# Patient Record
Sex: FEMALE | Race: WHITE
[De-identification: names, ages, dates, MRNs, and addresses within clinical notes are randomized per-mention and may not be internally consistent; named-entity substitution may affect disease eponyms.]

---

## 2018-02-08 ENCOUNTER — HOSPITAL ENCOUNTER (EMERGENCY)
Dept: HOSPITAL 46 - ED | Age: 58
Discharge: HOME | End: 2018-02-08
Payer: MEDICARE

## 2018-02-08 VITALS — WEIGHT: 145.57 LBS | BODY MASS INDEX: 24.25 KG/M2 | HEIGHT: 65 IN
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
## 2018-02-08 DIAGNOSIS — Z86.73: ICD-10-CM

## 2018-02-08 DIAGNOSIS — M16.12: Primary | ICD-10-CM

## 2018-02-08 DIAGNOSIS — Z79.899: ICD-10-CM

## 2018-02-08 DIAGNOSIS — Z87.891: ICD-10-CM

## 2022-03-24 NOTE — OR
[facility]
                                    [address], [place]  [address]
_________________________________________________________________________________________
                                                                 Signed   
 
 
DATE OF OPERATION:
03/24/2022
 
SURGEON:
Jennifer Barfield MD
 
PREOPERATIVE DIAGNOSES:
1. Personal history of colonic polyps 2016.
2. Mother with hyperplastic polyps.
 
POSTOPERATIVE DIAGNOSES:
1. 3-4 mm polyps at 7 cm.
2. 4 mm polyp at 20 cm/sigmoid colon.
3. Tortuous sigmoid colon.
4. 3 mm polyps at 12 cm/rectum.
 
PROCEDURE:
Colonoscopy with hot biopsy.
 
ESTIMATED BLOOD LOSS:
None.
 
INDICATIONS:
Reza is a 61-year-old female, who returns for her followup colonoscopy.  Her initial
colonoscopy in 2016 came at the age of 56.  We removed one tubular adenomatous polyp in
the sigmoid colon.  We know her mother had hyperplastic polyps in the past.  Reza had
a brain aneurysm surgery in the 1990s.  She had a stroke during that surgery.  She now
uses a cane to ambulate.  She has a lot of trouble with her left arm and leg.  She gets
around town on the bus.  Her brother comes out from [place], [place] every 2 to 3 weeks
to check on her and the mother.  He will be taking her home afterwards.  She has no
lower GI complaints.  In the office, I gave her a pamphlet on colonoscopy.  We reviewed
the nature of the test.  There is risk including, but not limited to gas bloating,
crampy abdominal pain, bleeding, perforation requiring surgery, and missed diagnosis.
We also reviewed the need for IV conscious sedation.  She has done well with Versed and
fentanyl in the past.  She expressed understanding and wished to proceed. 
 
PROCEDURE NOTE:
Reza was taken into our endoscopy suite and placed in the supine position.  We went
ahead and gave her some Versed and fentanyl initially to provide some initial sedation.
We then rotated her into the left lateral decubitus position onto her left arm and leg.
Appropriate padding and monitoring were placed.  We then gave her additional sedation.
In the end, she received 6 mg of Versed and 150 mcg of fentanyl to cover the case.  A
 
    Electronically Signed By: JENNIFER BARFIELD MD  03/24/22 1001
_________________________________________________________________________________________
PATIENT NAME:     REZA LOCKWOOD                      
MEDICAL RECORD #: [id number]            OPERATIVE REPORT              
          ACCT #: [id number]  
DATE OF BIRTH:   [birth year]            REPORT #: [id number]      
PHYSICIAN:        JENNIFER BARFIELD MD             
PCP:              HERMINIA STEPHENS MD           
REPORT IS CONFIDENTIAL AND NOT TO BE RELEASED WITHOUT AUTHORIZATION
 
 
                                  [facility]
                                    [address] [place] [address]  [address]
_________________________________________________________________________________________
                                                                 Signed   
 
 
digital rectal exam was performed and this was unremarkable.  The adult colonoscope was
introduced and advanced under direct visualization of the camera.  It takes a few
minutes to get through her sigmoid colon as it is moderately tortuous.  After that, the
scope buckled some in the sigmoid colon, but eventually we made it up to the cecum with
the help of additional sedation and abdominal compression.  As always, her prep was
quite excellent.  We could easily see her appendiceal orifice and the ileocecal valve.
The scope was then slowly withdrawn.  We took several pictures throughout for
photodocumentation.  The above-mentioned polyps were easily removed with the help of hot
biopsy forceps.  The scope had been retroflexed and we did not see any additional
pathology above the anal canal.  After this, the gas was suctioned out and colonoscope
removed.  Reza tolerated the procedure well. 
 
RECOMMENDATIONS:
I will see Reza back in my office in 7 to 14 days to review her results.  I suspect
she will stay on the five year plan due to her personal history. 
 
 
 
            ________________________________________
            Jennifer Barfield MD 
 
 
ALB/MODL
Job #:  [id number]
DD:  03/24/2022 09:14:32
DT:  03/24/2022 09:35:05
 
cc:            MD Jennifer Mallory MD
 
 
Copies:  JENNIFER BARFIELD MD
~
 
 
 
 
 
 
 
 
 
    Electronically Signed By: JENNIFER BARFIELD MD  03/24/22 1001
_________________________________________________________________________________________
PATIENT NAME:     REZA LOCKWOOD                      
MEDICAL RECORD #: [id number]            OPERATIVE REPORT              
          ACCT #: [id number]  
DATE OF BIRTH:   [birth year]            REPORT #: [id number]      
PHYSICIAN:        JENNIFER BARFIELD MD             
PCP:              HERMINIA STEPHENS MD           
REPORT IS CONFIDENTIAL AND NOT TO BE RELEASED WITHOUT AUTHORIZATION

## 2022-03-24 NOTE — NUR
1050-PATIENT IS NOT AS DROWSY AS SHE WAS FROM PACU. PATIENT IS TAKING SIPS OF
WATER. SHE IS READY TO GO HOME. PATIENTS BROTHER HELPING PATIENT GET DRESSED.
WHEELCHAIR RIDE GIVEN TO FRONT OF HOSPITAL WHERE HER BROTHER WAS WAITING.
HAVING PAIN IN LEFT HIP, DENIES NAUSEA.

## 2022-03-24 NOTE — NUR
03/24/22 0911 Carmen Acuña
0904-PATIENT ARRIVED TO PACU ON 3L NC PLACED ON 2L NC RR EVEN. PATIENT
WAS LAYING LEFT LATERAL NONAROUSABLE  REPOSITIONED WITH RN TO BACK.
PATIENT HAS HAD A HX OF STROKE AND LEFT ARM IS HOLDING A ROLL. IVF
INFUSING
 
0906-PATIENT REACTIVE TO VERBAL STIMULI OPENING EYES REMAINS VERY
DROWSY ORIENTED TO PACU. EASILY DOZES BACK TO SLEEP.

## 2022-03-28 NOTE — PATH
[facility]
                                    [address], [place]  [address]
_________________________________________________________________________________________
                                                                 Signed   
 
 
 
SPECIMEN(S): A POLYP AT 7 CM
SPECIMEN(S): B SIGMOID POLYP AT 20 CM
SPECIMEN(S): C RECTAL POLYP AT 12 CM
 
SPECIMEN SOURCE:
A. POLYP AT 7 CM
B. SIGMOID POLYP AT 20 CM
C. RECTAL POLYP AT 12 CM
 
CLINICAL HISTORY:
Colonoscopy.  Preop: Family hx.  Postop: Polyps.
 
FINAL PATHOLOGIC DIAGNOSIS:
A.  Colon, polyp at 7 cm, polypectomy:
-  Hyperplastic polyp.
-  Negative for dysplasia or malignancy.
B.  Colon, sigmoid, polyp at 20 cm, polypectomy:
-  Cauterized colonic mucosa with mild hyperplastic mucosal changes.
-  Negative for dysplasia or malignancy.
C.  Rectum, polyp at 12 cm, polypectomy:
-  Hyperplastic polyp.
-  Negative for dysplasia or malignancy.
 
COMMENT:
Regarding specimen B: Multiple additional deeper levels were examined.
NAL:[facility]:C2NR
 
MICROSCOPIC EXAMINATION:
Histologic sections of all submitted blocks are examined by light microscopy.  
These findings, together with the gross examination, support the pathologic 
diagnosis. 
 
GROSS DESCRIPTION:
Three specimens are received in three containers labeled with "LG."
A. The specimen, labeled "LG, 1," and designated on the requisition 
"polypectomy at 7 cm," is received in formalin and consists of one fragment of 
pink-tan tissue (0.4 cm in greatest dimension).  The 
specimen is submitted entirely in cassette (A1).
B. The specimen, labeled "LG, 2," and designated on the requisition "sigmoid 
polypectomy at 20 cm," is received in formalin and consists of one fragment of 
pink-tan tissue (0.3 cm in greatest 
 
                                                                                    
_________________________________________________________________________________________
PATIENT NAME:     REZA LOCKWOOD                      
MEDICAL RECORD #: [id number]            PATHOLOGY                     
          ACCT #: [id number]       ACCESSION #: [id number]     
DATE OF BIRTH:   [birth year]            REPORT #: [id number]       
PHYSICIAN:        ALLEN AQUINO              
PCP:              HERMINIA STEPHENS MD           
REPORT IS CONFIDENTIAL AND NOT TO BE RELEASED WITHOUT AUTHORIZATION
 
 
                                  [facility]
                                    [address] [address]
_________________________________________________________________________________________
                                                                 Signed   
 
 
dimension).  The specimen is submitted entirely in cassette (B1).
C. The specimen, labeled "LG, 3," and designated on the requisition "rectum 
polypectomy at 12 cm," is received in formalin and consists of one fragment of 
pink-tan tissue (0.3 cm in greatest 
dimension).  The specimen is submitted entirely in cassette (C1).
AC (under the direct supervision of a pathologist)
 
The Gross Description was prepared using a voice recognition system. The report 
was reviewed for accuracy; however, sound-alike word errors, addition and/or 
deletions may occur. If there is any 
question about this report, please contact Client Services.
 
PERFORMING LABORATORY:
The technical component was performed by [facility] [address] (Medical Director: Michaela Knox MD; CLIA# [id number]).  
Professional [facility] performed by 
[facility] [address] [address] (CLIA# [id number]). 
 
Diagnostician:  Asuncion Cummings MD
Pathologist
Electronically Signed 03/25/2022
 
 
Copies:                                
~
 
 
 
 
 
 
 
 
 
 
 
 
 
 
 
 
 
                                                                                    
_________________________________________________________________________________________
PATIENT NAME:     REZA LOCKWOOD AMAYA                      
MEDICAL RECORD #: [id number]            PATHOLOGY                     
          ACCT #: [id number]       ACCESSION #: [id number]     
DATE OF BIRTH:   [birth year]            REPORT #: [id number]       
PHYSICIAN:        [facility]              
PCP:              HERMINIA STEPHENS MD           
REPORT IS CONFIDENTIAL AND NOT TO BE RELEASED WITHOUT AUTHORIZATION